# Patient Record
Sex: MALE | Race: OTHER
[De-identification: names, ages, dates, MRNs, and addresses within clinical notes are randomized per-mention and may not be internally consistent; named-entity substitution may affect disease eponyms.]

---

## 2018-05-26 ENCOUNTER — HOSPITAL ENCOUNTER (OUTPATIENT)
Dept: HOSPITAL 25 - ED | Age: 48
Setting detail: OBSERVATION
LOS: 1 days | Discharge: HOME | End: 2018-05-27
Attending: HOSPITALIST | Admitting: HOSPITALIST
Payer: COMMERCIAL

## 2018-05-26 DIAGNOSIS — M54.9: ICD-10-CM

## 2018-05-26 DIAGNOSIS — Z87.891: ICD-10-CM

## 2018-05-26 DIAGNOSIS — I10: ICD-10-CM

## 2018-05-26 DIAGNOSIS — R42: ICD-10-CM

## 2018-05-26 DIAGNOSIS — M50.320: ICD-10-CM

## 2018-05-26 DIAGNOSIS — N17.9: Primary | ICD-10-CM

## 2018-05-26 LAB
BASOPHILS # BLD AUTO: 0 10^3/UL (ref 0–0.2)
EOSINOPHIL # BLD AUTO: 0.1 10^3/UL (ref 0–0.6)
HCT VFR BLD AUTO: 48 % (ref 42–52)
HGB BLD-MCNC: 15.6 G/DL (ref 14–18)
LYMPHOCYTES # BLD AUTO: 1.5 10^3/UL (ref 1–4.8)
MCH RBC QN AUTO: 27 PG (ref 27–31)
MCHC RBC AUTO-ENTMCNC: 33 G/DL (ref 31–36)
MCV RBC AUTO: 82 FL (ref 80–94)
MONOCYTES # BLD AUTO: 1.3 10^3/UL (ref 0–0.8)
NEUTROPHILS # BLD AUTO: 13.8 10^3/UL (ref 1.5–7.7)
NRBC # BLD AUTO: 0 10^3/UL
NRBC BLD QL AUTO: 0
PLATELET # BLD AUTO: 288 10^3/UL (ref 150–450)
RBC # BLD AUTO: 5.83 10^6/UL (ref 4–5.4)
WBC # BLD AUTO: 16.7 10^3/UL (ref 3.5–10.8)

## 2018-05-26 PROCEDURE — G0103 PSA SCREENING: HCPCS

## 2018-05-26 PROCEDURE — 84443 ASSAY THYROID STIM HORMONE: CPT

## 2018-05-26 PROCEDURE — 72125 CT NECK SPINE W/O DYE: CPT

## 2018-05-26 PROCEDURE — 83735 ASSAY OF MAGNESIUM: CPT

## 2018-05-26 PROCEDURE — 93005 ELECTROCARDIOGRAM TRACING: CPT

## 2018-05-26 PROCEDURE — 96361 HYDRATE IV INFUSION ADD-ON: CPT

## 2018-05-26 PROCEDURE — 86703 HIV-1/HIV-2 1 RESULT ANTBDY: CPT

## 2018-05-26 PROCEDURE — 81003 URINALYSIS AUTO W/O SCOPE: CPT

## 2018-05-26 PROCEDURE — 84300 ASSAY OF URINE SODIUM: CPT

## 2018-05-26 PROCEDURE — 84153 ASSAY OF PSA TOTAL: CPT

## 2018-05-26 PROCEDURE — 80048 BASIC METABOLIC PNL TOTAL CA: CPT

## 2018-05-26 PROCEDURE — 83935 ASSAY OF URINE OSMOLALITY: CPT

## 2018-05-26 PROCEDURE — 85652 RBC SED RATE AUTOMATED: CPT

## 2018-05-26 PROCEDURE — 81015 MICROSCOPIC EXAM OF URINE: CPT

## 2018-05-26 PROCEDURE — 84540 ASSAY OF URINE/UREA-N: CPT

## 2018-05-26 PROCEDURE — 83036 HEMOGLOBIN GLYCOSYLATED A1C: CPT

## 2018-05-26 PROCEDURE — 86803 HEPATITIS C AB TEST: CPT

## 2018-05-26 PROCEDURE — 82570 ASSAY OF URINE CREATININE: CPT

## 2018-05-26 PROCEDURE — 71045 X-RAY EXAM CHEST 1 VIEW: CPT

## 2018-05-26 PROCEDURE — G0378 HOSPITAL OBSERVATION PER HR: HCPCS

## 2018-05-26 PROCEDURE — 87086 URINE CULTURE/COLONY COUNT: CPT

## 2018-05-26 PROCEDURE — 70450 CT HEAD/BRAIN W/O DYE: CPT

## 2018-05-26 PROCEDURE — 99283 EMERGENCY DEPT VISIT LOW MDM: CPT

## 2018-05-26 PROCEDURE — 84484 ASSAY OF TROPONIN QUANT: CPT

## 2018-05-26 PROCEDURE — 85025 COMPLETE CBC W/AUTO DIFF WBC: CPT

## 2018-05-26 PROCEDURE — 83880 ASSAY OF NATRIURETIC PEPTIDE: CPT

## 2018-05-26 PROCEDURE — 36415 COLL VENOUS BLD VENIPUNCTURE: CPT

## 2018-05-26 PROCEDURE — 85730 THROMBOPLASTIN TIME PARTIAL: CPT

## 2018-05-26 PROCEDURE — 80053 COMPREHEN METABOLIC PANEL: CPT

## 2018-05-26 PROCEDURE — 82550 ASSAY OF CK (CPK): CPT

## 2018-05-26 PROCEDURE — 96360 HYDRATION IV INFUSION INIT: CPT

## 2018-05-26 PROCEDURE — 83605 ASSAY OF LACTIC ACID: CPT

## 2018-05-26 PROCEDURE — 82553 CREATINE MB FRACTION: CPT

## 2018-05-26 RX ADMIN — SODIUM CHLORIDE SCH MLS/HR: 900 IRRIGANT IRRIGATION at 18:32

## 2018-05-26 NOTE — RAD
HISTORY: Dizziness



COMPARISONS: None



TECHNIQUE: Multiple contiguous axial CT scans were obtained of the head without 

intravenous contrast. 



FINDINGS: 

HEMORRHAGE/INFARCT: There is no hemorrhage or acute infarct.

MASSES/SHIFT: There is no mass or shift.



EXTRA-AXIAL SPACES: There are no extra-axial fluid collections.

SULCI AND VENTRICLES: The sulci and ventricles are normal in size and position for the

patient's stated age.



CEREBRUM: There are no focal parenchymal abnormalities.

BRAINSTEM: There are no focal parenchymal abnormalities.

CEREBELLUM: There are no focal parenchymal abnormalities.



VESSELS: The vessels are grossly normal.

PARANASAL SINUSES: The paranasal sinuses are clear.

ORBITS: The orbits are unremarkable.

BONES AND SOFT TISSUE: No bone or soft tissue abnormalities are noted.



OTHER: None



IMPRESSION: 

NO ACUTE INTRACRANIAL PATHOLOGY.

## 2018-05-26 NOTE — ED
Hang SULLIVAN Natalie, scribed for Israel Dempsey MD on 05/26/18 at 1350 .





Shortness of Breath





- HPI Summary


HPI Summary: 


The patient is a 49 y/o M presenting to the ED c/o SOB, dizziness, and neck 

pain starting this morning at 07:00. 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 He denies chest 

pain. He has hx of HTN, he has not had any surgeries, and he does not smoke or 

drink.





- History of Current Complaint


Chief Complaint: EDChestPainROMI


Time Seen by Provider: 05/26/18 13:24


Hx Obtained From: Patient


Onset/Duration: Sudden Onset, Lasting Hours, Still Present


Timing: Constant


Current Severity: Moderate


Alleviating Factors: Nothing


Associated Signs & Symptoms: Negative - chest pain, Dizzy





- Allergy/Home Medications


Allergies/Adverse Reactions: 


 Allergies











Allergy/AdvReac Type Severity Reaction Status Date / Time


 


No Known Allergies Allergy   Verified 05/26/18 13:21











Home Medications: 


 Home Medications





Lisinopril TAB* [Prinivil TAB*] 20 mg PO DAILY 05/26/18 [History Confirmed 05/26 /18]











PMH/Surg Hx/FS Hx/Imm Hx


Endocrine/Hematology History: 


   Denies: Hx Diabetes


Cardiovascular History: Reports: Hx Hypertension


Infectious Disease History: No


Infectious Disease History: 


   Denies: Traveled Outside the [place] in Last 30 Days





- Family History


Known Family History: Positive: Diabetes





- Social History


Alcohol Use: None


Substance Use Type: Reports: None


Smoking Status (MU): Former Smoker





Review of Systems


Positive: Other - dizziness


Negative: Chest Pain


Positive: Shortness Of Breath


Positive: Nausea


Positive: Other - neck pain, back pain


All Other Systems Reviewed And Are Negative: Yes





Physical Exam





- Summary


Physical Exam Summary: 


VITAL SIGNS: Reviewed.


GENERAL: Patient is a well-developed and nourished male who is lying 

comfortable in the stretcher. Patient is not in any acute respiratory distress.


HEAD AND FACE: No signs of trauma. No ecchymosis, hematomas or skull 

depressions. No sinus tenderness.


EYES: PERRLA, EOMI x 2, No injected conjunctiva, no nystagmus.


EARS: Hearing grossly intact. Ear canals and tympanic membranes are within 

normal limits.


MOUTH: Oropharynx within normal limits.


NECK: Supple, trachea is midline, no adenopathy, no JVD, no carotid bruit, no c-

spine tenderness, neck with full ROM.


CHEST: Symmetric, no tenderness at palpation


LUNGS: Clear to auscultation bilaterally. No wheezing or crackles.


CVS: Regular rate and rhythm, S1 and S2 present, no murmurs or gallops 

appreciated.


ABDOMEN: Soft, non-tender. No signs of distention. No rebound no guarding, and 

no masses palpated. Bowel sounds are normal.


EXTREMITIES: FROM in all major joints, no edema, no cyanosis or clubbing.


NEURO: Alert and oriented x 3. No acute neurological deficits. Speech is normal 

and follows commands.


SKIN: Dry and warm


Triage Information Reviewed: Yes


Vital Signs On Initial Exam: 


 Initial Vitals











Temp Pulse Resp BP Pulse Ox


 


 97.8 F   74   26   126/68   96 


 


 05/26/18 13:26  05/26/18 13:26  05/26/18 13:26  05/26/18 13:26  05/26/18 13:26











Vital Signs Reviewed: Yes





Diagnostics





- Vital Signs


 Vital Signs











  Temp Pulse Resp BP Pulse Ox


 


 05/26/18 13:35   91  19   98


 


 05/26/18 13:28   80  20  126/68  96


 


 05/26/18 13:26  97.8 F  74  26  126/68  96














- Laboratory


Lab Results: 


 Lab Results











  05/26/18 05/26/18 05/26/18 Range/Units





  13:52 13:52 13:52 


 


WBC  16.7 H    (3.5-10.8)  10^3/ul


 


RBC  5.83 H    (4.0-5.4)  10^6/ul


 


Hgb  15.6    (14.0-18.0)  g/dl


 


Hct  48    (42-52)  %


 


MCV  82    (80-94)  fL


 


MCH  27    (27-31)  pg


 


MCHC  33    (31-36)  g/dl


 


RDW  14    (10.5-15)  %


 


Plt Count  288    (150-450)  10^3/ul


 


MPV  8.4    (7.4-10.4)  um3


 


Neut % (Auto)  82.8    (38-83)  %


 


Lymph % (Auto)  8.7 L    (25-47)  %


 


Mono % (Auto)  8.0 H    (0-7)  %


 


Eos % (Auto)  0.4    (0-6)  %


 


Baso % (Auto)  0.1    (0-2)  %


 


Absolute Neuts (auto)  13.8 H    (1.5-7.7)  10^3/ul


 


Absolute Lymphs (auto)  1.5    (1.0-4.8)  10^3/ul


 


Absolute Monos (auto)  1.3 H    (0-0.8)  10^3/ul


 


Absolute Eos (auto)  0.1    (0-0.6)  10^3/ul


 


Absolute Basos (auto)  0    (0-0.2)  10^3/ul


 


Absolute Nucleated RBC  0    10^3/ul


 


Nucleated RBC %  0    


 


APTT   30.1   (26.0-36.3)  seconds


 


Sodium    130 L  (139-145)  mmol/L


 


Potassium    3.7  (3.5-5.0)  mmol/L


 


Chloride    93 L  (101-111)  mmol/L


 


Carbon Dioxide    24  (22-32)  mmol/L


 


Anion Gap    13 H  (2-11)  mmol/L


 


BUN    64 H  (6-24)  mg/dL


 


Creatinine    3.37 H  (0.67-1.17)  mg/dL


 


Est GFR ( Amer)    25.2  (>60)  


 


Est GFR (Non-Af Amer)    19.6  (>60)  


 


BUN/Creatinine Ratio    19.0  (8-20)  


 


Glucose    114 H  ()  mg/dL


 


Lactic Acid     (0.5-2.0)  mmol/L


 


Calcium    9.7  (8.6-10.3)  mg/dL


 


Magnesium    2.5  (1.9-2.7)  mg/dL


 


Total Bilirubin    0.50  (0.2-1.0)  mg/dL


 


AST    31  (13-39)  U/L


 


ALT    13  (7-52)  U/L


 


Alkaline Phosphatase    86  ()  U/L


 


Total Creatine Kinase    591 H  ()  U/L


 


CK-MB (CK-2)    19.8 H  (0.6-6.3)  ng/mL


 


Troponin I    0.00  (<0.04)  ng/mL


 


B-Natriuretic Peptide    ( - 100) pg/mL


 


Total Protein    7.9  (6.4-8.9)  g/dL


 


Albumin    4.7  (3.2-5.2)  g/dL


 


Globulin    3.2  (2-4)  g/dL


 


Albumin/Globulin Ratio    1.5  (1-3)  


 


TSH    1.37  (0.34-5.60)  mcIU/mL














  05/26/18 05/26/18 Range/Units





  13:52 13:52 


 


WBC    (3.5-10.8)  10^3/ul


 


RBC    (4.0-5.4)  10^6/ul


 


Hgb    (14.0-18.0)  g/dl


 


Hct    (42-52)  %


 


MCV    (80-94)  fL


 


MCH    (27-31)  pg


 


MCHC    (31-36)  g/dl


 


RDW    (10.5-15)  %


 


Plt Count    (150-450)  10^3/ul


 


MPV    (7.4-10.4)  um3


 


Neut % (Auto)    (38-83)  %


 


Lymph % (Auto)    (25-47)  %


 


Mono % (Auto)    (0-7)  %


 


Eos % (Auto)    (0-6)  %


 


Baso % (Auto)    (0-2)  %


 


Absolute Neuts (auto)    (1.5-7.7)  10^3/ul


 


Absolute Lymphs (auto)    (1.0-4.8)  10^3/ul


 


Absolute Monos (auto)    (0-0.8)  10^3/ul


 


Absolute Eos (auto)    (0-0.6)  10^3/ul


 


Absolute Basos (auto)    (0-0.2)  10^3/ul


 


Absolute Nucleated RBC    10^3/ul


 


Nucleated RBC %    


 


APTT    (26.0-36.3)  seconds


 


Sodium    (139-145)  mmol/L


 


Potassium    (3.5-5.0)  mmol/L


 


Chloride    (101-111)  mmol/L


 


Carbon Dioxide    (22-32)  mmol/L


 


Anion Gap    (2-11)  mmol/L


 


BUN    (6-24)  mg/dL


 


Creatinine    (0.67-1.17)  mg/dL


 


Est GFR ( Amer)    (>60)  


 


Est GFR (Non-Af Amer)    (>60)  


 


BUN/Creatinine Ratio    (8-20)  


 


Glucose    ()  mg/dL


 


Lactic Acid  0.9   (0.5-2.0)  mmol/L


 


Calcium    (8.6-10.3)  mg/dL


 


Magnesium    (1.9-2.7)  mg/dL


 


Total Bilirubin    (0.2-1.0)  mg/dL


 


AST    (13-39)  U/L


 


ALT    (7-52)  U/L


 


Alkaline Phosphatase    ()  U/L


 


Total Creatine Kinase    ()  U/L


 


CK-MB (CK-2)    (0.6-6.3)  ng/mL


 


Troponin I    (<0.04)  ng/mL


 


B-Natriuretic Peptide   7 ( - 100) pg/mL


 


Total Protein    (6.4-8.9)  g/dL


 


Albumin    (3.2-5.2)  g/dL


 


Globulin    (2-4)  g/dL


 


Albumin/Globulin Ratio    (1-3)  


 


TSH    (0.34-5.60)  mcIU/mL











Result Diagrams: 


 05/26/18 13:52





 05/26/18 13:52


Lab Statement: Any lab studies that have been ordered have been reviewed, and 

results considered in the medical decision making process.





- Radiology


  ** CXR


Xray Interpretation: No Acute Changes - No active cardiopulmonary disease. ED 

physician has reviewed this report.


Radiology Interpretation Completed By: Radiologist





- CT


  ** Brain CT


CT Interpretation: No Acute Changes - No acute intracranial pathology. ED 

physician has reviewed this report.


CT Interpretation Completed By: Radiologist





  ** Cervical Spine CT


CT Interpretation: Positive (See Comments) - Degenerative disc disease and 

osteoarthritis most pronounced at C4-C5, C5-C6, and C6-C7. There is neural 

foraminal narrowing as described above. There is no osseous central canal 

stenosis. ED physician has reviewed this report.


CT Interpretation Completed By: Radiologist





- EKG


  ** 13:30


Cardiac Rate: NL


EKG Rhythm: Sinus Rhythm - 78BPM


EKG Interpretation: Without any ST elevations.





Re-Evaluation





- Re-Evaluation


  ** First Eval


Re-Evaluation Time: 16:09


Change: Unchanged


Comment: I spoke with the patient concerning imaging results and admittance to 

[facility] under the care of Dr. Raines.





Course/Dx





- Course


Assessment/Plan: This patient is a 48-year-old male who presents to the 

emergency department complaining of shortness of breath, neck pain and 

dizziness.  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 

suddenly he developed the symptoms.  The patient denies any history of trauma.  

Patient reports that he has past medical history significant for hypertension.  

In the ED course the patient was placed in a cardiac monitor, IV access was 

obtained, and EKG was ordered.  Test results without any significant 

abnormality except for what was a count of 16.7, red blood cells 5.8.  Sodium 

is 1:30, chloride is 93, anion gap is 13 BUNs 64 and creatinine of 3.37.  

Glucose is 114. Total creatinine kinase is 391.  Urinalysis is negative for 

UTI.  C-spine CT impression: DDD and osteoarthritis.  Head CT impression: No 

acute intracranial Pathology.  Chest x-ray impression: No active 

cardiopulmonary disease.  In the ED course the patient was given 2 L of IV 

fluids since the patient seems to be dehydrated.  However, because of the acute 

renal failure.  I discuss my physical exam, findings and test results with Dr. Raines  from the hospitalist services and he agrees to admit patient to his 

services. Patient is hemodynamically stable alert and oriented x 3.





- Diagnoses


Provider Diagnoses: 


 Acute renal failure, Dizziness








- Physician Notifications


Discussed Care of Patient With: Brad Raines


Time Discussed With Above Provider: 16:06


Instructed by Provider To: Admit As Inpatient - Dr. Raines accepts admission of 

the patient.





Discharge





- Sign-Out/Discharge


Documenting (check all that apply): Discharge/Admit/Transfer





- Discharge Plan


Condition: Stable


Disposition: ADMITTED TO [facility]





- Billing Disposition and Condition


Condition: STABLE


Disposition: [facility]





The documentation as recorded by the Hang mendoza Natalie accurately reflects 

the service I personally performed and the decisions made by me, Israel Dempsey MD.

## 2018-05-26 NOTE — RAD
HISTORY: Chest pain



COMPARISONS: None



VIEWS: 1: frontal portable view of the chest at 1:46 PM



FINDINGS:

LINES AND TUBES: None.

CARDIOMEDIASTINAL SILHOUETTE: The cardiomediastinal silhouette is normal for portable

technique.

PLEURA: The costophrenic angles are sharp. No pleural abnormalities are noted.

LUNG PARENCHYMA: The lungs are clear.

ABDOMEN: The upper abdomen is clear. There is no subphrenic gas.

BONES AND SOFT TISSUES: No bone or soft tissue abnormalities are noted.



IMPRESSION: NO ACTIVE CARDIOPULMONARY DISEASE.

## 2018-05-26 NOTE — RAD
HISTORY: Neck pain



COMPARISONS: None



TECHNIQUE: Multiple contiguous axial CT scans were obtained of the cervical spine without

intravenous contrast, with coronal and sagittal multiplanar reformations.    



FINDINGS:



BRAIN: The visualized brain is unremarkable

CENTRAL CANAL: Evaluation of the central canal is limited on CT technique; however, there

is no obvious canalicular mass or epidural hemorrhage.



ALIGNMENT: The alignment is normal, without subluxation or dislocation.

VERTEBRAL BODIES: There is mild anterolateral marginal osteophyte formation at C4-C5,

C5-C6, and C6-C7.

JOINTS: There is uncovertebral and facet hypertrophy most pronounced along the mid

cervical spine.

MUSCULATURE: Unremarkable

INTERVERTEBRAL DISCS: There is diffuse loss of intervertebral disc height.



AXIAL IMAGES:

C2-C3: There is no osseous neural foraminal narrowing or central canal stenosis.

C3-C4: There is no osseous neural foraminal narrowing or central canal stenosis.

C4-C5: There is no osseous neural foraminal narrowing or central canal stenosis.

C5-C6: There is uncovertebral hypertrophy. There is moderate left and mild right neural

foraminal narrowing. There is no osseous central canal stenosis.

C6-C7: There is no osseous neural foraminal narrowing or central canal stenosis.

C7-T1: There is no osseous neural foraminal narrowing or central canal stenosis.



SOFT TISSUES: The visualized soft tissues of the neck are unremarkable. The prevertebral

fat stripe is preserved.



OTHER: None.



IMPRESSION: 

DEGENERATIVE DISC DISEASE AND OSTEOARTHRITIS MOST PRONOUNCED AT C4-C5, C5-C6, AND C6-C7.

THERE IS NEURAL FORAMINAL NARROWING AS DESCRIBED ABOVE. THERE IS NO OSSEOUS CENTRAL CANAL

STENOSIS.

## 2018-05-26 NOTE — HP
HISTORY AND PHYSICAL:

 

DATE OF ADMISSION:  05/26/18

 

ADMITTING PROVIDER:  Brad Raines MD

 

PRIMARY CARE PHYSICIAN:  Is in [facility].

 

CHIEF COMPLAINT:  Dizziness, lightheadedness, trouble breathing, darkening of 
vision, neck pain.

 

HISTORY OF PRESENT ILLNESS:  Jorge Chavira is a 48-year-old male originally from 
[facility], history of hypertension on lisinopril, who works as a migrant worker 
in the bee industry.  His temporary base is in [place], [place] and the day 
prior to admission he felt like he was not urinating as much and had some 
suprapubic pain that was mild.  Morning of admission he also "did not feel right
," did not urinate in the morning like he usually does.  Around 10 a.m. he had 
sensation of dizziness, lightheadedness, some trouble breathing, pain in his 
mid neck and his upper back along with some darkening of his vision.  He was 
accompanied by Chepe Hill, who is his employer in the bee industry, who was 
concerned for potential myocardial infarction, so they went to the [employer] and got 2 regular aspirin ingested around 11:00-11:30 without 
improvement in his symptoms. He again did not feel well, was found to be kind 
of holding his neck and was taken to the emergency room.  Initial evaluation 
here demonstrated creatinine of 3.37, leukocytosis of 16.7, troponin of 0.00, 
BUN of 64 and sodium of 130.  He has had chest x-ray cervical C-spine and CT 
head noncontrast without significant finding. He was referred to the 
hospitalist service for admission for acute renal failure. The patient got 2 L 
of normal saline in the emergency room, had been unable to initially urinate, 
had a bladder scan just now with approximately 295 cc and then was able to 
urinate and provide urinalysis sample.  The patient attests that back in 
[facility] on 02/01/18, he had a routine physical and was prescribed lisinopril 
20 mg daily at that time and also was diagnosed with "infection in his kidneys.
" He was given unknown antibiotic for 8 to 10 day course.  At that time, he 
also had what he describes as an ultrasound of his complete abdomen including 
his liver, kidneys and bladder, which reportedly was not concerning at that 
time.  The patient denies any chest pressure or tightness, in fact is now 
feeling back to his baseline with only some mild pain in his left flank.  He 
denies history of known kidney dysfunction.

 

PAST MEDICAL HISTORY:  Hypertension, "kidney infection" February 2018.

 

PAST SURGICAL HISTORY:  Appendectomy.

 

MEDICATIONS:

1.  Lisinopril 20 mg daily.

2.  Vitamin, not able to otherwise specify.

 

ALLERGIES:  No known drug allergies.

 

SOCIAL HISTORY:  The patient is a former smoker between ages of 17 and 25, 
approximately 1 pack per day at that time.  Would occasionally drink 1 beer or 
rum during that age, but has only had approximately 2 or 3 beers over the last 
year. He is , his wife is back in [facility].  His son, 28-year-old, 
Judy is residing in [place], also is a migrant worker and is his desired 
medical surrogate at this point in time.  He desires to be a full code.  He is 
accompanied by his employer Chepe Sergio of [place], [place].  He also 
denies any illicit drug use including any IV drug use.

 

REVIEW OF SYSTEMS:  A complete 14-point review of systems negative except as 
per HPI.  He denies any nosebleeds, wheezing, chronic shortness of breath.  He 
has soft bowel movements without any evidence of blood.  He did attest to some 
significant cramping in his left thigh morning of admission that has since 
resolved.

 

                               PHYSICAL EXAMINATION

 

GENERAL APPEARANCE:  No acute distress.

 

VITAL SIGNS:  Temperature 98.4; pulse rate 62; respiratory rate 15; satting 97% 
on room air; blood pressure initially 126/68, low of 96/72, currently 102/53.

 

HEENT:  Normocephalic, atraumatic.  Pupils equally round and reactive to light. 
Extraocular motions intact.  No scleral icterus.  Moist mucous membranes.

 

NECK:  Supple.  No cervical lymphadenopathy.

 

PULMONARY:  Clear to auscultation bilaterally with no wheezing, rales, or 
rhonchi.

 

CARDIOVASCULAR:  Regular rate and rhythm.  No murmurs, rubs or gallops.

 

ABDOMEN:  Soft, nontender, nondistended.

 

EXTREMITIES:  Warm, well perfused.  No peripheral edema.

 

BACK:  Slight CVA tenderness bilaterally.

 

NEUROLOGIC:  Cranial nerves II through XII intact.   strength 5/5.  
Sensation is intact.

 

SKIN:  No lesions.  No rashes.

 

 DIAGNOSTIC STUDIES/LAB DATA:  White count 16.7, hemoglobin 15.6, hematocrit 48
, platelets 288,000.  Sodium 138, potassium 3.7, chloride 93, carbon dioxide 24
, anion gap 13, BUN 64, creatinine 3.37, glucose 114, lactic acid 0.9, 
magnesium 2.5, total bili 0.50, AST 31, ALT 13, alk phos 86.  Total creatine 
kinase 591, CK-MB 19.8, troponin 0.00x2, BNP 7, albumin 4.7, TSH 1.37.

 

Imaging:  CT head on contrast demonstrated no acute intracranial pathology. 
Cervical C-spine demonstrated some degenerative disk disease and osteoarthritis 
at C4-C5, C5-C6 and C6-C7 with moderate on the left and mild on the right, 
neuroforaminal narrowing.  No osseous central canal stenosis.  Chest x-ray 
demonstrated no acute cardiopulmonary process.  His EKG demonstrated normal 
sinus rhythm, some early repolarization in V2 to V3.  No ST elevations or 
depressions. Normal axis.  QTc is 453.

 

ASSESSMENT AND PLAN:  Jorge Chavira is a 48-year-old male from [facility], 
working as a migrant worker with history of hypertension on ACE inhibitor.  He 
is presenting with dizziness, lightheadedness, trouble breathing, vision change
, flank pain and some evidence of a kidney dysfunction of undetermined 
chronicity along with leukocytosis.  He has had some complaint of some reduced 
urinary output over the last 2 days.  There were urine lytes done, which was 
consistent with FENa of 0.64%, FEurea of 21.8%, both consistent with prerenal 
pathology.  I am adding on ESR and HIV test for other causes of acute renal 
failure.  He is getting hydrated, get BMPs in the morning.  I am going to 
ultrasound his kidneys.  His urinalysis is now back and is not concerning for 
infection, but does have some proteinuria and blood and hyaline casts.  I will 
follow up the urine culture.   He can eat a heart healthy diet.  We will follow 
up his sodium levels. His TSH is within normal limits.  His leukocytosis is 
slightly unexplained. We will see if any evidence of hydronephrosis on his 
ultrasound.  Symptomatically, he is feeling quite improved now.  A PSA is 
added.  Consideration for flomax if he continues to have hesitancy or reduced 
urine output.  Also notable he is just transiently here in [place], temporarily 
based in [place], [place] and then only for the next week. It would be 
helpful to obtain some of his records from [facility] though that is unlikely to 
happen.  He is status post 2 large aspirin.  His troponins are negative.  We 
will get a third to rule out any acute coronary syndrome.  No ischemic changes 
on his EKG. He is being admitted to observation status.  He is a full code.  
Medical surrogate is his son, Judy Chavira. 

 

[id number] #: [id number]

LEEANNE

## 2018-05-27 VITALS — DIASTOLIC BLOOD PRESSURE: 64 MMHG | SYSTOLIC BLOOD PRESSURE: 105 MMHG

## 2018-05-27 LAB
BASOPHILS # BLD AUTO: 0 10^3/UL (ref 0–0.2)
EOSINOPHIL # BLD AUTO: 0.1 10^3/UL (ref 0–0.6)
HCT VFR BLD AUTO: 43 % (ref 42–52)
HGB BLD-MCNC: 14.3 G/DL (ref 14–18)
LYMPHOCYTES # BLD AUTO: 1.7 10^3/UL (ref 1–4.8)
MCH RBC QN AUTO: 27 PG (ref 27–31)
MCHC RBC AUTO-ENTMCNC: 33 G/DL (ref 31–36)
MCV RBC AUTO: 83 FL (ref 80–94)
MONOCYTES # BLD AUTO: 0.6 10^3/UL (ref 0–0.8)
NEUTROPHILS # BLD AUTO: 4.9 10^3/UL (ref 1.5–7.7)
NRBC # BLD AUTO: 0 10^3/UL
NRBC BLD QL AUTO: 0.1
PLATELET # BLD AUTO: 258 10^3/UL (ref 150–450)
RBC # BLD AUTO: 5.25 10^6/UL (ref 4–5.4)
WBC # BLD AUTO: 7.3 10^3/UL (ref 3.5–10.8)

## 2018-05-27 RX ADMIN — SODIUM CHLORIDE SCH MLS/HR: 900 IRRIGANT IRRIGATION at 07:42

## 2018-05-27 NOTE — DS
DISCHARGE SUMMARY:

 

DATE OF ADMISSION:  05/26/18

 

DATE OF DISCHARGE:  05/27/18

 

PRINCIPAL DISCHARGE DIAGNOSIS:  Acute kidney injury.

 

SECONDARY DISCHARGE DIAGNOSIS:  ? Hypertension.

 

HOSPITAL COURSE BY PROBLEM:

1.  Acute kidney injury.  Mr. Chavira presented to the emergency department with 
dizziness and lightheadedness and diffuse body pain and was found to have acute 
kidney injury.  His admission creatinine was 3.37 from an unknown baseline and 
his urine electrolytes were consistent with a prerenal etiology.  After IV 
fluid resuscitation the following morning, his creatinine is 1.25.  While his 
history is not completely consistent and convincing for a simple dehydration 
picture, he does work as a bee worker on a bee farm and does cite hot weather 
and working hard outside recently; however, he is an otherwise healthy man who 
has been able to keep up with his p.o. fluids, so that story is not perfect; 
however, he improved so drastically just with normal saline resuscitation that 
is supportive of a prerenal etiology.  He was taking some NSAIDs for the muscle 
aches; however, said he only took 1 to 2 tablets per day.  He also was taking 
lisinopril, which may also have contributed.  Otherwise, the workup has been 
negative.  His A1c is 5.3.  He has been normotensive off antihypertensives.  
Unfortunately, a renal ultrasound was not able to be done due to the holiday 
weekend; however, again the trial of IV fluids improving his renal function so 
markedly suggests a prerenal etiology anyway.  His PSA was unremarkable and he 
had no urinary complaints.  He did have some hyaline casts, which is 
nonspecific.  At the time of discharge, an HCV antibody and HIV are pending.  
Mr. Chavira has given me permission to speak with his boss, Chepe Hill at the 
phone number [phone], if those tests return as abnormal and I will follow 
up on those tomorrow.  He is euvolemic at the time of discharge.  I am 
instructing him not to take any NSAIDs and to discontinue his lisinopril as he 
has been normotensive off blood pressure medications.  I am encouraging him to 
stay hydrated over the next few days.

2.  Hypertension.  As mentioned, he has been normotensive off his lisinopril.  
I am not prescribing an alternative antihypertensive since he has been 
normotensive and in the setting of ALVARO, I would favor avoiding any hypotension.

 

PHYSICAL EXAMINATION:  At the time of discharge; temperature 98.1, heart rate 55
, respiratory rate 16, pulse ox 99% on room air, blood pressure 105/64.  General
: Alert young well-appearing fit man, in no distress.  HEENT:  Pupils are equal
, round, and reactive to light.  Oral mucosa is moist with no pharyngeal 
exudates or erythema.  Chest:  Regular rate and rhythm.  No murmurs.  No JVP.  
Lungs are clear bilaterally.  Abdomen:  Soft, nontender, nondistended.  No 
guarding.  Liver is nonpalpable.  No CVA tenderness.  Extremities:  No rashes.  
No arthralgias.  No edema.

 

 [id number] #: [id number]

[facility]